# Patient Record
Sex: FEMALE | Race: WHITE | NOT HISPANIC OR LATINO | URBAN - METROPOLITAN AREA
[De-identification: names, ages, dates, MRNs, and addresses within clinical notes are randomized per-mention and may not be internally consistent; named-entity substitution may affect disease eponyms.]

---

## 2021-01-14 ENCOUNTER — OUTPATIENT (OUTPATIENT)
Dept: OUTPATIENT SERVICES | Facility: HOSPITAL | Age: 36
LOS: 1 days | End: 2021-01-14
Payer: COMMERCIAL

## 2021-01-14 VITALS — HEART RATE: 82 BPM | OXYGEN SATURATION: 97 %

## 2021-01-14 VITALS
DIASTOLIC BLOOD PRESSURE: 50 MMHG | RESPIRATION RATE: 16 BRPM | OXYGEN SATURATION: 97 % | SYSTOLIC BLOOD PRESSURE: 91 MMHG | HEART RATE: 79 BPM | TEMPERATURE: 98 F

## 2021-01-14 DIAGNOSIS — O26.899 OTHER SPECIFIED PREGNANCY RELATED CONDITIONS, UNSPECIFIED TRIMESTER: ICD-10-CM

## 2021-01-14 DIAGNOSIS — Z3A.00 WEEKS OF GESTATION OF PREGNANCY NOT SPECIFIED: ICD-10-CM

## 2021-01-14 PROCEDURE — G0463: CPT

## 2021-01-14 PROCEDURE — 59025 FETAL NON-STRESS TEST: CPT

## 2021-01-14 NOTE — OB PROVIDER TRIAGE NOTE - HISTORY OF PRESENT ILLNESS
Pt is a 35y GP @ wks presenting for . PNC Uncomplicated. Endorses good FM. Denies LOF/VB/frequent Ctx. GBS Neg. EFW      EFW  GBS  PNC    OBHx:   GYNHx:   PMH:   PSH:  Meds:  All:  Soc: No EtOH, tobacco, illicit drug use in pregnancy  Psych:   FHx: No bleeding/clotting disorders in pregnancy    T(C): 36.9 (01-14-21 @ 12:42), Max: 36.9 (01-14-21 @ 11:41)  HR: 82 (01-14-21 @ 12:51) (76 - 87)  BP: 100/59 (01-14-21 @ 12:42) (91/50 - 100/59)  RR: 16 (01-14-21 @ 11:53) (16 - 16)  SpO2: 97% (01-14-21 @ 12:51) (96% - 98%)  Sono: Vtx  VE:  EFM: FHR  [place]: Ctx Qmin      A/P: Pt is a 35y GP @ wks presenting for.  -Admit to L&D, Routine labs, IVF  -IOL With:   -EFM + [place]    D/w Dr. Hannah Ryan PGY- Pt is a 35y  @31w3d GA by [facility] 3/15/21, presents c/o decreased FM. PNC Uncomplicated. Denies LOF/VB/Ctx.    EFW unknown  GBS unknown    OBH: 3/2019 FT pC/S 2/2 macrosomia  GYNH: denies fibroids/cysts/abnormal paps/STIs   PM/SH: denies   Meds: PNV  All: NKDA  Soc: Denies alcohol, tobacco, illicit drug use in pregnancy

## 2021-01-14 NOTE — OB PROVIDER TRIAGE NOTE - NSHPPHYSICALEXAM_GEN_ALL_CORE
T(C): 36.9 (01-14-21 @ 12:42), Max: 36.9 (01-14-21 @ 11:41)  HR: 82 (01-14-21 @ 12:51) (76 - 87)  BP: 100/59 (01-14-21 @ 12:42) (91/50 - 100/59)  RR: 16 (01-14-21 @ 11:53) (16 - 16)  SpO2: 97% (01-14-21 @ 12:51) (96% - 98%)    Gen: NAD  Abd: Soft, NT, gravid  EFM:  135/mod/+accels/-deccels  [place]: no ctx  BSS: breech, posterior placenta, +FHR, SANTIAGO 14.2, BPP 8/8

## 2021-01-14 NOTE — OB PROVIDER TRIAGE NOTE - NSOBPROVIDERNOTE_OBGYN_ALL_OB_FT
A/P: 35y  @31w3d GA presents c/o decreased FM. BPP /8, NST reactive.     -Patient cleared for discharge  - precautions given     D/w Dr. Beni Ryan PGY-3

## 2021-02-26 ENCOUNTER — OUTPATIENT (OUTPATIENT)
Dept: OUTPATIENT SERVICES | Facility: HOSPITAL | Age: 36
LOS: 1 days | End: 2021-02-26
Payer: COMMERCIAL

## 2021-02-26 VITALS
HEIGHT: 61 IN | SYSTOLIC BLOOD PRESSURE: 101 MMHG | RESPIRATION RATE: 16 BRPM | TEMPERATURE: 99 F | WEIGHT: 158.07 LBS | HEART RATE: 86 BPM | OXYGEN SATURATION: 98 % | DIASTOLIC BLOOD PRESSURE: 62 MMHG

## 2021-02-26 DIAGNOSIS — O34.211 MATERNAL CARE FOR LOW TRANSVERSE SCAR FROM PREVIOUS CESAREAN DELIVERY: ICD-10-CM

## 2021-02-26 DIAGNOSIS — Z29.9 ENCOUNTER FOR PROPHYLACTIC MEASURES, UNSPECIFIED: ICD-10-CM

## 2021-02-26 DIAGNOSIS — Z01.818 ENCOUNTER FOR OTHER PREPROCEDURAL EXAMINATION: ICD-10-CM

## 2021-02-26 PROBLEM — Z78.9 OTHER SPECIFIED HEALTH STATUS: Chronic | Status: ACTIVE | Noted: 2021-01-14

## 2021-02-26 LAB
BLD GP AB SCN SERPL QL: NEGATIVE — SIGNIFICANT CHANGE UP
HCT VFR BLD CALC: 31.3 % — LOW (ref 34.5–45)
HGB BLD-MCNC: 10 G/DL — LOW (ref 11.5–15.5)
MCHC RBC-ENTMCNC: 27.2 PG — SIGNIFICANT CHANGE UP (ref 27–34)
MCHC RBC-ENTMCNC: 31.9 GM/DL — LOW (ref 32–36)
MCV RBC AUTO: 85.1 FL — SIGNIFICANT CHANGE UP (ref 80–100)
NRBC # BLD: 0 /100 WBCS — SIGNIFICANT CHANGE UP (ref 0–0)
PLATELET # BLD AUTO: 228 K/UL — SIGNIFICANT CHANGE UP (ref 150–400)
RBC # BLD: 3.68 M/UL — LOW (ref 3.8–5.2)
RBC # FLD: 13.4 % — SIGNIFICANT CHANGE UP (ref 10.3–14.5)
RH IG SCN BLD-IMP: POSITIVE — SIGNIFICANT CHANGE UP
WBC # BLD: 9.99 K/UL — SIGNIFICANT CHANGE UP (ref 3.8–10.5)
WBC # FLD AUTO: 9.99 K/UL — SIGNIFICANT CHANGE UP (ref 3.8–10.5)

## 2021-02-26 PROCEDURE — 85027 COMPLETE CBC AUTOMATED: CPT

## 2021-02-26 PROCEDURE — 86900 BLOOD TYPING SEROLOGIC ABO: CPT

## 2021-02-26 PROCEDURE — 86901 BLOOD TYPING SEROLOGIC RH(D): CPT

## 2021-02-26 PROCEDURE — G0463: CPT

## 2021-02-26 PROCEDURE — 86850 RBC ANTIBODY SCREEN: CPT

## 2021-02-26 RX ORDER — OXYTOCIN 10 UNIT/ML
333.33 VIAL (ML) INJECTION
Qty: 20 | Refills: 0 | Status: DISCONTINUED | OUTPATIENT
Start: 2021-03-12 | End: 2021-03-14

## 2021-02-26 NOTE — OB PST NOTE - PROBLEM SELECTOR PLAN 1
Repeat C section on 3/12/21  Pre- Op Instructions discussed   Labs sent  Covid scheduled 3 days prior to surgery

## 2021-02-26 NOTE — OB PST NOTE - ASSESSMENT
CAPRINI SCORE [CLOT updated 18]    AGE RELATED RISK FACTORS                                                       MOBILITY RELATED FACTORS  [ ] Age 41-60 years                                            (1 Point)                    [ ] Bed rest                                                        (1 Point)  [ ] Age: 61-74 years                                           (2 Points)                  [ ] Plaster cast                                                   (2 Points)  [ ] Age= 75 years                                              (3 Points)                    [ ] Bed bound for more than 72 hours                 (2 Points)    DISEASE RELATED RISK FACTORS                                               GENDER SPECIFIC FACTORS  [ ] Edema in the lower extremities                       (1 Point)              [ x] Pregnancy                                                     (1 Point)  [ ] Varicose veins                                               (1 Point)                     [ ] Post-partum < 6 weeks                                   (1 Point)             [x ] BMI > 25 Kg/m2                                            (1 Point)                     [ ] Hormonal therapy  or oral contraception          (1 Point)                 [ ] Sepsis (in the previous month)                        (1 Point)               [ ] History of pregnancy complications                 (1 point)  [ ] Pneumonia or serious lung disease                                               [ ] Unexplained or recurrent                     (1 Point)           (in the previous month)                               (1 Point)  [ ] Abnormal pulmonary function test                     (1 Point)                 SURGERY RELATED RISK FACTORS  [ ] Acute myocardial infarction                              (1 Point)               [ ]  Section                                             (1 Point)  [ ] Congestive heart failure (in the previous month)  (1 Point)      [ ] Minor surgery                                                  (1 Point)   [ ] Inflammatory bowel disease                             (1 Point)               [ ] Arthroscopic surgery                                        (2 Points)  [ ] Central venous access                                      (2 Points)                [x ] General surgery lasting more than 45 minutes (2 points)  [ ] Present or previous malignancy                     (2 Points)                [ ] Elective arthroplasty                                         (5 points)    [ ] Stroke (in the previous month)                          (5 Points)                                                                                                                                                           HEMATOLOGY RELATED FACTORS                                                 TRAUMA RELATED RISK FACTORS  [ ] Prior episodes of VTE                                     (3 Points)                [ ] Fracture of the hip, pelvis, or leg                       (5 Points)  [ ] Positive family history for VTE                         (3 Points)             [ ] Acute spinal cord injury (in the previous month)  (5 Points)  [ ] Prothrombin 53878 A                                     (3 Points)               [ ] Paralysis  (less than 1 month)                             (5 Points)  [ ] Factor V Leiden                                             (3 Points)                  [ ] Multiple Trauma within 1 month                        (5 Points)  [ ] Lupus anticoagulants                                     (3 Points)                                                           [ ] Anticardiolipin antibodies                               (3 Points)                                                       [ ] High homocysteine in the blood                      (3 Points)                                             [ ] Other congenital or acquired thrombophilia      (3 Points)                                                [ ] Heparin induced thrombocytopenia                  (3 Points)                                     Total Score [      4    ]

## 2021-02-26 NOTE — OB PST NOTE - NSHPPHYSICALEXAM_GEN_ALL_CORE
PHYSICAL EXAM:      Constitutional: NAD  HEENT: NCAT, PERRLA, EOMI,  Neck: No LAD, No JVD  Respiratory: CTA b/l ,  No Wheezing /rhonchi  Cardiovascular: S1 and S2 +  Gastrointestinal:  Soft, NT, ND, BS +  Extremities: No peripheral edema  Neurological: AAO x 3,  Gross motor and sensational intact. no focal deficits

## 2021-02-26 NOTE — OB PST NOTE - HISTORY OF PRESENT ILLNESS
36 y/o female , 37 weeks/4 days gestation with h/o c section planned for repeat C section on 3/15/21. Pt denies any fever, chills cold symptoms , covid exposure or recent travel.     Covid test scheduled 3 days prior to c section

## 2021-02-26 NOTE — OB PST NOTE - NSANTHOSAYNRD_GEN_A_CORE
No. AIDEN screening performed.  STOP BANG Legend: 0-2 = LOW Risk; 3-4 = INTERMEDIATE Risk; 5-8 = HIGH Risk

## 2021-03-10 ENCOUNTER — OUTPATIENT (OUTPATIENT)
Dept: OUTPATIENT SERVICES | Facility: HOSPITAL | Age: 36
LOS: 1 days | End: 2021-03-10
Payer: COMMERCIAL

## 2021-03-10 DIAGNOSIS — Z11.52 ENCOUNTER FOR SCREENING FOR COVID-19: ICD-10-CM

## 2021-03-10 LAB — SARS-COV-2 RNA SPEC QL NAA+PROBE: SIGNIFICANT CHANGE UP

## 2021-03-10 PROCEDURE — C9803: CPT

## 2021-03-10 PROCEDURE — U0005: CPT

## 2021-03-10 PROCEDURE — U0003: CPT

## 2021-03-12 ENCOUNTER — INPATIENT (INPATIENT)
Facility: HOSPITAL | Age: 36
LOS: 1 days | Discharge: HOME | End: 2021-03-14
Attending: OBSTETRICS & GYNECOLOGY | Admitting: OBSTETRICS & GYNECOLOGY
Payer: COMMERCIAL

## 2021-03-12 VITALS
RESPIRATION RATE: 18 BRPM | SYSTOLIC BLOOD PRESSURE: 100 MMHG | HEIGHT: 61 IN | TEMPERATURE: 97 F | HEART RATE: 60 BPM | WEIGHT: 156.09 LBS | DIASTOLIC BLOOD PRESSURE: 58 MMHG

## 2021-03-12 DIAGNOSIS — Z01.818 ENCOUNTER FOR OTHER PREPROCEDURAL EXAMINATION: ICD-10-CM

## 2021-03-12 DIAGNOSIS — O34.211 MATERNAL CARE FOR LOW TRANSVERSE SCAR FROM PREVIOUS CESAREAN DELIVERY: ICD-10-CM

## 2021-03-12 LAB
BLD GP AB SCN SERPL QL: NEGATIVE — SIGNIFICANT CHANGE UP
RH IG SCN BLD-IMP: POSITIVE — SIGNIFICANT CHANGE UP
SARS-COV-2 IGG SERPL QL IA: POSITIVE
SARS-COV-2 IGM SERPL IA-ACNC: 2.53 INDEX — HIGH
T PALLIDUM AB TITR SER: NEGATIVE — SIGNIFICANT CHANGE UP

## 2021-03-12 RX ORDER — NALOXONE HYDROCHLORIDE 4 MG/.1ML
0.1 SPRAY NASAL
Refills: 0 | Status: DISCONTINUED | OUTPATIENT
Start: 2021-03-12 | End: 2021-03-14

## 2021-03-12 RX ORDER — MAGNESIUM HYDROXIDE 400 MG/1
30 TABLET, CHEWABLE ORAL
Refills: 0 | Status: DISCONTINUED | OUTPATIENT
Start: 2021-03-12 | End: 2021-03-14

## 2021-03-12 RX ORDER — LANOLIN
1 OINTMENT (GRAM) TOPICAL EVERY 6 HOURS
Refills: 0 | Status: DISCONTINUED | OUTPATIENT
Start: 2021-03-12 | End: 2021-03-14

## 2021-03-12 RX ORDER — IBUPROFEN 200 MG
600 TABLET ORAL EVERY 6 HOURS
Refills: 0 | Status: COMPLETED | OUTPATIENT
Start: 2021-03-12 | End: 2022-02-08

## 2021-03-12 RX ORDER — OXYCODONE HYDROCHLORIDE 5 MG/1
5 TABLET ORAL
Refills: 0 | Status: DISCONTINUED | OUTPATIENT
Start: 2021-03-12 | End: 2021-03-12

## 2021-03-12 RX ORDER — DIPHENHYDRAMINE HCL 50 MG
25 CAPSULE ORAL EVERY 6 HOURS
Refills: 0 | Status: COMPLETED | OUTPATIENT
Start: 2021-03-12 | End: 2022-02-08

## 2021-03-12 RX ORDER — DIPHENHYDRAMINE HCL 50 MG
25 CAPSULE ORAL EVERY 6 HOURS
Refills: 0 | Status: DISCONTINUED | OUTPATIENT
Start: 2021-03-12 | End: 2021-03-14

## 2021-03-12 RX ORDER — OXYCODONE HYDROCHLORIDE 5 MG/1
5 TABLET ORAL
Refills: 0 | Status: COMPLETED | OUTPATIENT
Start: 2021-03-12 | End: 2021-03-19

## 2021-03-12 RX ORDER — MORPHINE SULFATE 50 MG/1
0.1 CAPSULE, EXTENDED RELEASE ORAL ONCE
Refills: 0 | Status: DISCONTINUED | OUTPATIENT
Start: 2021-03-12 | End: 2021-03-13

## 2021-03-12 RX ORDER — KETOROLAC TROMETHAMINE 30 MG/ML
30 SYRINGE (ML) INJECTION EVERY 6 HOURS
Refills: 0 | Status: DISCONTINUED | OUTPATIENT
Start: 2021-03-12 | End: 2021-03-13

## 2021-03-12 RX ORDER — FAMOTIDINE 10 MG/ML
20 INJECTION INTRAVENOUS ONCE
Refills: 0 | Status: COMPLETED | OUTPATIENT
Start: 2021-03-12 | End: 2021-03-12

## 2021-03-12 RX ORDER — CITRIC ACID/SODIUM CITRATE 300-500 MG
15 SOLUTION, ORAL ORAL ONCE
Refills: 0 | Status: COMPLETED | OUTPATIENT
Start: 2021-03-12 | End: 2021-03-12

## 2021-03-12 RX ORDER — CHLORHEXIDINE GLUCONATE 213 G/1000ML
1 SOLUTION TOPICAL ONCE
Refills: 0 | Status: COMPLETED | OUTPATIENT
Start: 2021-03-12 | End: 2021-03-12

## 2021-03-12 RX ORDER — DIPHENOXYLATE HCL/ATROPINE 2.5-.025MG
2 TABLET ORAL ONCE
Refills: 0 | Status: DISCONTINUED | OUTPATIENT
Start: 2021-03-12 | End: 2021-03-12

## 2021-03-12 RX ORDER — SODIUM CHLORIDE 9 MG/ML
1000 INJECTION, SOLUTION INTRAVENOUS ONCE
Refills: 0 | Status: COMPLETED | OUTPATIENT
Start: 2021-03-12 | End: 2021-03-12

## 2021-03-12 RX ORDER — ACETAMINOPHEN 500 MG
975 TABLET ORAL
Refills: 0 | Status: DISCONTINUED | OUTPATIENT
Start: 2021-03-12 | End: 2021-03-14

## 2021-03-12 RX ORDER — TETANUS TOXOID, REDUCED DIPHTHERIA TOXOID AND ACELLULAR PERTUSSIS VACCINE, ADSORBED 5; 2.5; 8; 8; 2.5 [IU]/.5ML; [IU]/.5ML; UG/.5ML; UG/.5ML; UG/.5ML
0.5 SUSPENSION INTRAMUSCULAR ONCE
Refills: 0 | Status: DISCONTINUED | OUTPATIENT
Start: 2021-03-12 | End: 2021-03-14

## 2021-03-12 RX ORDER — OXYTOCIN 10 UNIT/ML
333.33 VIAL (ML) INJECTION
Qty: 20 | Refills: 0 | Status: DISCONTINUED | OUTPATIENT
Start: 2021-03-12 | End: 2021-03-14

## 2021-03-12 RX ORDER — HEPARIN SODIUM 5000 [USP'U]/ML
5000 INJECTION INTRAVENOUS; SUBCUTANEOUS EVERY 12 HOURS
Refills: 0 | Status: DISCONTINUED | OUTPATIENT
Start: 2021-03-12 | End: 2021-03-14

## 2021-03-12 RX ORDER — ONDANSETRON 8 MG/1
4 TABLET, FILM COATED ORAL EVERY 6 HOURS
Refills: 0 | Status: DISCONTINUED | OUTPATIENT
Start: 2021-03-12 | End: 2021-03-14

## 2021-03-12 RX ORDER — NALBUPHINE HYDROCHLORIDE 10 MG/ML
2.5 INJECTION, SOLUTION INTRAMUSCULAR; INTRAVENOUS; SUBCUTANEOUS EVERY 6 HOURS
Refills: 0 | Status: DISCONTINUED | OUTPATIENT
Start: 2021-03-12 | End: 2021-03-14

## 2021-03-12 RX ORDER — CEFAZOLIN SODIUM 1 G
2000 VIAL (EA) INJECTION ONCE
Refills: 0 | Status: COMPLETED | OUTPATIENT
Start: 2021-03-12 | End: 2021-03-12

## 2021-03-12 RX ORDER — SIMETHICONE 80 MG/1
80 TABLET, CHEWABLE ORAL EVERY 4 HOURS
Refills: 0 | Status: DISCONTINUED | OUTPATIENT
Start: 2021-03-12 | End: 2021-03-14

## 2021-03-12 RX ORDER — SODIUM CHLORIDE 9 MG/ML
1000 INJECTION, SOLUTION INTRAVENOUS
Refills: 0 | Status: DISCONTINUED | OUTPATIENT
Start: 2021-03-12 | End: 2021-03-12

## 2021-03-12 RX ORDER — METOCLOPRAMIDE HCL 10 MG
10 TABLET ORAL ONCE
Refills: 0 | Status: COMPLETED | OUTPATIENT
Start: 2021-03-12 | End: 2021-03-12

## 2021-03-12 RX ORDER — DEXAMETHASONE 0.5 MG/5ML
4 ELIXIR ORAL EVERY 6 HOURS
Refills: 0 | Status: DISCONTINUED | OUTPATIENT
Start: 2021-03-12 | End: 2021-03-14

## 2021-03-12 RX ORDER — OXYCODONE HYDROCHLORIDE 5 MG/1
10 TABLET ORAL
Refills: 0 | Status: DISCONTINUED | OUTPATIENT
Start: 2021-03-12 | End: 2021-03-12

## 2021-03-12 RX ADMIN — SODIUM CHLORIDE 125 MILLILITER(S): 9 INJECTION, SOLUTION INTRAVENOUS at 09:53

## 2021-03-12 RX ADMIN — HEPARIN SODIUM 5000 UNIT(S): 5000 INJECTION INTRAVENOUS; SUBCUTANEOUS at 20:32

## 2021-03-12 RX ADMIN — MAGNESIUM HYDROXIDE 30 MILLILITER(S): 400 TABLET, CHEWABLE ORAL at 20:31

## 2021-03-12 RX ADMIN — Medication 30 MILLIGRAM(S): at 23:25

## 2021-03-12 RX ADMIN — Medication 30 MILLIGRAM(S): at 16:15

## 2021-03-12 RX ADMIN — ONDANSETRON 4 MILLIGRAM(S): 8 TABLET, FILM COATED ORAL at 07:34

## 2021-03-12 RX ADMIN — Medication 4 MILLIGRAM(S): at 07:58

## 2021-03-12 RX ADMIN — Medication 2 TABLET(S): at 09:15

## 2021-03-12 RX ADMIN — Medication 975 MILLIGRAM(S): at 20:32

## 2021-03-12 RX ADMIN — FAMOTIDINE 20 MILLIGRAM(S): 10 INJECTION INTRAVENOUS at 06:57

## 2021-03-12 RX ADMIN — Medication 1000 MILLIUNIT(S)/MIN: at 09:52

## 2021-03-12 RX ADMIN — Medication 30 MILLIGRAM(S): at 15:35

## 2021-03-12 RX ADMIN — CHLORHEXIDINE GLUCONATE 1 APPLICATION(S): 213 SOLUTION TOPICAL at 06:58

## 2021-03-12 RX ADMIN — Medication 15 MILLILITER(S): at 06:57

## 2021-03-12 RX ADMIN — Medication 10 MILLIGRAM(S): at 07:34

## 2021-03-12 RX ADMIN — Medication 975 MILLIGRAM(S): at 21:30

## 2021-03-12 RX ADMIN — Medication 975 MILLIGRAM(S): at 13:02

## 2021-03-12 RX ADMIN — Medication 100 MILLIGRAM(S): at 07:43

## 2021-03-12 RX ADMIN — Medication 25 MILLIGRAM(S): at 23:42

## 2021-03-12 RX ADMIN — SODIUM CHLORIDE 2000 MILLILITER(S): 9 INJECTION, SOLUTION INTRAVENOUS at 06:58

## 2021-03-12 NOTE — OB PROVIDER DELIVERY SUMMARY - NSPROVIDERDELIVERYNOTE_OBGYN_ALL_OB_FT
scheduled rLTCS, uncomplicated  viable female infant, vertex presentation, Apgars 8/9, 8lb cord gasses sent  Grossly normal fallopian tubes, uterus, and ovaries  Hysterotomy closed in single layer with caprosyn  Methergine, Hemabate and Cytotec given intraoperatively due to atony, which resolved     QBL: 604  EBL: 800  IVF: 1900  UOP: 325    Acnef 2g     Juan PGY2  w/ Dr. Kang

## 2021-03-12 NOTE — OB PROVIDER H&P - ASSESSMENT
36yo  at 39+4 for scheduled rLTCS  - Per OR schedule  - Anesthesia consult  - Consents to be signed with Dr. Jorge Luis Martinez PGY2

## 2021-03-12 NOTE — PRE-ANESTHESIA EVALUATION ADULT - NSANTHPMHFT_GEN_ALL_CORE
No cardiac or pulmonary disease  No bleeding or clotting disorders  No issues with current pregnancy

## 2021-03-12 NOTE — OB PROVIDER H&P - NSHPPHYSICALEXAM_GEN_ALL_CORE
Vital Signs Last 24 Hrs  T(C): --  T(F): --  HR: 81 (12 Mar 2021 06:07) (81 - 81)  BP: 100/58 (12 Mar 2021 06:07) (100/58 - 100/58)  BP(mean): --  RR: --  SpO2: --    Gen NAD AOX3  Abd soft, nontender, gravid  Ext nontender b/l    BSS vertex, anterior placenta   mod suzie +accel -decel  Ctx irritability on toco

## 2021-03-12 NOTE — OB NEONATOLOGY/PEDIATRICIAN DELIVERY SUMMARY - NSPEDSNEONOTESA_OBGYN_ALL_OB_FT
39.4wk female born via rpt C/S to a 36y/o  mother. Mother with blood type B+. GBS negative on , no ROM or labor. PNL neg/NR/I. ROM at delivery, clear. Mother has no PMH. No pregnancy complications. Baby warmed/dried/stimulated and started to cry vigorously. Apgars 8/9. EOS N/A given no ROM or labor. No maternal fever. Mother wants to breastfeed. Consents to Hep B.

## 2021-03-12 NOTE — OB RN DELIVERY SUMMARY - NS_SEPSISRSKCALC_OBGYN_ALL_OB_FT
EOS calculated successfully. EOS Risk Factor: 0.5/1000 live births ([facility] national incidence); GA=39w4d; Temp=97.2; ROM=0.017; GBS='Unknown'; Antibiotics='No antibiotics or any antibiotics < 2 hrs prior to birth'

## 2021-03-12 NOTE — OB PROVIDER H&P - HISTORY OF PRESENT ILLNESS
36yo  at 39+4 for scheduled rLTCS. No ctx, lof, vb. +FM.     PNC uncomplicated. Denies GDM. Denies HTN.   ObHx pLTCS 2019 for arrest of dilation 8#6 ([facility])  GynHx denies  PMHx denies, denies asthma  PSurgHx C/S  Meds PNV  All NKDA

## 2021-03-12 NOTE — CHART NOTE - NSCHARTNOTEFT_GEN_A_CORE
NP Chart Note:    at the bedside for patient evaluation after two uterotonic medications administered during the surgery.   The patient denies SOB, chest pain, dizziness. The patient additionally denies nausea/vomiting    ICU Vital Signs Last 24 Hrs  T(C): 36.9 (12 Mar 2021 11:25), Max: 36.9 (12 Mar 2021 11:25)  T(F): 98.4 (12 Mar 2021 11:25), Max: 98.4 (12 Mar 2021 11:25)  HR: 72 (12 Mar 2021 11:25) (56 - 84)  BP: 119/74 (12 Mar 2021 11:25) (100/58 - 126/61)  BP(mean): 86 (12 Mar 2021 11:25) (78 - 88)  ABP: --  ABP(mean): --  RR: 20 (12 Mar 2021 11:25) (13 - 33)  SpO2: 95% (12 Mar 2021 11:25) (95% - 99%)    gen: A&Ox3  CV: rrr s1s2  abd: soft, non-distended, fundus firm  Ext: bilateral pedal pulses    34 y/o rltcs, EBL: 800, methergine, cytotec, and hemabate administered during surgery with good resulting hemostasis  -patient cleared for discharge from PACU to a post partum floor    Sarita Mae NP

## 2021-03-13 LAB
HCT VFR BLD CALC: 26.5 % — LOW (ref 34.5–45)
HGB BLD-MCNC: 8.2 G/DL — LOW (ref 11.5–15.5)
MCHC RBC-ENTMCNC: 25.9 PG — LOW (ref 27–34)
MCHC RBC-ENTMCNC: 30.9 GM/DL — LOW (ref 32–36)
MCV RBC AUTO: 83.9 FL — SIGNIFICANT CHANGE UP (ref 80–100)
NRBC # BLD: 0 /100 WBCS — SIGNIFICANT CHANGE UP (ref 0–0)
PLATELET # BLD AUTO: 192 K/UL — SIGNIFICANT CHANGE UP (ref 150–400)
RBC # BLD: 3.16 M/UL — LOW (ref 3.8–5.2)
RBC # FLD: 13.8 % — SIGNIFICANT CHANGE UP (ref 10.3–14.5)
WBC # BLD: 10.39 K/UL — SIGNIFICANT CHANGE UP (ref 3.8–10.5)
WBC # FLD AUTO: 10.39 K/UL — SIGNIFICANT CHANGE UP (ref 3.8–10.5)

## 2021-03-13 RX ORDER — OXYCODONE HYDROCHLORIDE 5 MG/1
5 TABLET ORAL
Refills: 0 | Status: DISCONTINUED | OUTPATIENT
Start: 2021-03-13 | End: 2021-03-14

## 2021-03-13 RX ORDER — FERROUS SULFATE 325(65) MG
325 TABLET ORAL THREE TIMES A DAY
Refills: 0 | Status: DISCONTINUED | OUTPATIENT
Start: 2021-03-13 | End: 2021-03-14

## 2021-03-13 RX ORDER — ASCORBIC ACID 60 MG
500 TABLET,CHEWABLE ORAL THREE TIMES A DAY
Refills: 0 | Status: DISCONTINUED | OUTPATIENT
Start: 2021-03-13 | End: 2021-03-14

## 2021-03-13 RX ORDER — IBUPROFEN 200 MG
600 TABLET ORAL EVERY 6 HOURS
Refills: 0 | Status: DISCONTINUED | OUTPATIENT
Start: 2021-03-13 | End: 2021-03-14

## 2021-03-13 RX ORDER — ONDANSETRON 8 MG/1
4 TABLET, FILM COATED ORAL ONCE
Refills: 0 | Status: COMPLETED | OUTPATIENT
Start: 2021-03-13 | End: 2021-03-13

## 2021-03-13 RX ADMIN — Medication 600 MILLIGRAM(S): at 18:53

## 2021-03-13 RX ADMIN — Medication 30 MILLIGRAM(S): at 12:21

## 2021-03-13 RX ADMIN — Medication 30 MILLIGRAM(S): at 13:01

## 2021-03-13 RX ADMIN — Medication 500 MILLIGRAM(S): at 18:53

## 2021-03-13 RX ADMIN — ONDANSETRON 4 MILLIGRAM(S): 8 TABLET, FILM COATED ORAL at 21:30

## 2021-03-13 RX ADMIN — Medication 975 MILLIGRAM(S): at 10:00

## 2021-03-13 RX ADMIN — HEPARIN SODIUM 5000 UNIT(S): 5000 INJECTION INTRAVENOUS; SUBCUTANEOUS at 21:02

## 2021-03-13 RX ADMIN — Medication 975 MILLIGRAM(S): at 17:15

## 2021-03-13 RX ADMIN — Medication 975 MILLIGRAM(S): at 02:31

## 2021-03-13 RX ADMIN — SIMETHICONE 80 MILLIGRAM(S): 80 TABLET, CHEWABLE ORAL at 19:56

## 2021-03-13 RX ADMIN — Medication 325 MILLIGRAM(S): at 18:53

## 2021-03-13 RX ADMIN — Medication 975 MILLIGRAM(S): at 22:40

## 2021-03-13 RX ADMIN — Medication 30 MILLIGRAM(S): at 06:01

## 2021-03-13 RX ADMIN — Medication 975 MILLIGRAM(S): at 16:38

## 2021-03-13 RX ADMIN — Medication 30 MILLIGRAM(S): at 00:25

## 2021-03-13 RX ADMIN — HEPARIN SODIUM 5000 UNIT(S): 5000 INJECTION INTRAVENOUS; SUBCUTANEOUS at 09:15

## 2021-03-13 RX ADMIN — Medication 975 MILLIGRAM(S): at 09:16

## 2021-03-13 RX ADMIN — Medication 975 MILLIGRAM(S): at 03:30

## 2021-03-13 RX ADMIN — Medication 30 MILLIGRAM(S): at 05:04

## 2021-03-13 RX ADMIN — Medication 975 MILLIGRAM(S): at 23:30

## 2021-03-13 NOTE — CHART NOTE - NSCHARTNOTEFT_GEN_A_CORE
PA NOTE            POD#1         Vital Signs Last 24 Hrs  T(C): 36.7 (13 Mar 2021 05:57), Max: 37.2 (12 Mar 2021 15:00)  T(F): 98 (13 Mar 2021 05:57), Max: 98.9 (12 Mar 2021 15:00)  HR: 73 (13 Mar 2021 05:57) (56 - 84)  BP: 92/54 (13 Mar 2021 05:57) (92/54 - 125/65)  BP(mean): 86 (12 Mar 2021 11:25) (78 - 88)  RR: 18 (13 Mar 2021 05:57) (18 - 33)  SpO2: 97% (13 Mar 2021 05:57) (95% - 99%)               8.2    10.39 )-----------( 192      ( 03-13 @ 07:48 )             26.5           Assessment: Anemia secondary to acute blood loss due to delivery    Plan:  - Ferrous Sulfate, Vitamin C supplementation.  - Monitor for signs/symptoms of anemia.   - Does not require transfusion at this time

## 2021-03-14 VITALS
HEART RATE: 71 BPM | RESPIRATION RATE: 18 BRPM | OXYGEN SATURATION: 96 % | TEMPERATURE: 98 F | DIASTOLIC BLOOD PRESSURE: 61 MMHG | SYSTOLIC BLOOD PRESSURE: 108 MMHG

## 2021-03-14 PROCEDURE — 86901 BLOOD TYPING SEROLOGIC RH(D): CPT

## 2021-03-14 PROCEDURE — 86769 SARS-COV-2 COVID-19 ANTIBODY: CPT

## 2021-03-14 PROCEDURE — 59025 FETAL NON-STRESS TEST: CPT

## 2021-03-14 PROCEDURE — 85025 COMPLETE CBC W/AUTO DIFF WBC: CPT

## 2021-03-14 PROCEDURE — 86900 BLOOD TYPING SEROLOGIC ABO: CPT

## 2021-03-14 PROCEDURE — 86850 RBC ANTIBODY SCREEN: CPT

## 2021-03-14 PROCEDURE — 59050 FETAL MONITOR W/REPORT: CPT

## 2021-03-14 PROCEDURE — 86780 TREPONEMA PALLIDUM: CPT

## 2021-03-14 RX ORDER — ACETAMINOPHEN 500 MG
3 TABLET ORAL
Qty: 0 | Refills: 0 | DISCHARGE
Start: 2021-03-14

## 2021-03-14 RX ORDER — IBUPROFEN 200 MG
1 TABLET ORAL
Qty: 0 | Refills: 0 | DISCHARGE
Start: 2021-03-14

## 2021-03-14 RX ORDER — OXYCODONE HYDROCHLORIDE 5 MG/1
1 TABLET ORAL
Qty: 10 | Refills: 0
Start: 2021-03-14

## 2021-03-14 RX ADMIN — Medication 600 MILLIGRAM(S): at 01:20

## 2021-03-14 RX ADMIN — Medication 600 MILLIGRAM(S): at 01:59

## 2021-03-14 RX ADMIN — Medication 600 MILLIGRAM(S): at 09:28

## 2021-03-14 RX ADMIN — Medication 975 MILLIGRAM(S): at 12:32

## 2021-03-14 RX ADMIN — Medication 600 MILLIGRAM(S): at 09:58

## 2021-03-14 RX ADMIN — Medication 325 MILLIGRAM(S): at 05:58

## 2021-03-14 RX ADMIN — OXYCODONE HYDROCHLORIDE 5 MILLIGRAM(S): 5 TABLET ORAL at 05:57

## 2021-03-14 RX ADMIN — Medication 975 MILLIGRAM(S): at 13:02

## 2021-03-14 RX ADMIN — Medication 975 MILLIGRAM(S): at 05:57

## 2021-03-14 RX ADMIN — HEPARIN SODIUM 5000 UNIT(S): 5000 INJECTION INTRAVENOUS; SUBCUTANEOUS at 09:31

## 2021-03-14 RX ADMIN — Medication 500 MILLIGRAM(S): at 05:58

## 2021-03-14 NOTE — DISCHARGE NOTE OB - CARE PROVIDER_API CALL
Ulysses Bazan J  OBSTETRICS AND GYNECOLOGY  [address] [place] [address]  Phone: [phone]  Fax: [phone]  Follow Up Time:

## 2021-03-14 NOTE — PROGRESS NOTE ADULT - ASSESSMENT
A/P: 34yo POD#2 s/p LTCS.  Patient is stable and doing well post-operatively.  
Stable post op
    A/P:  35y  POD # 1 S/P  repeat    section, sp methergine,hemabate,cytotec for atony, ebl 800  Doing well    PMHx:PAST MEDICAL & SURGICAL HISTORY:  No pertinent past medical history     delivery delivered  2019      Current Issues:

## 2021-03-14 NOTE — PROGRESS NOTE ADULT - SUBJECTIVE AND OBJECTIVE BOX
Pt seen and examined at bedside. Pt states mild abdominal pain. Pt [x ] ambulating, tolerating _reg__ diet, [ ] flatus, [ ]BM, [x ] urinating adequately.   Pt denies fever, chills, chest pain, SOB, nausea, vomiting, lightheadedness, dizziness.      T(F): 98 (03-13-21 @ 05:57), Max: 98.9 (03-12-21 @ 15:00)  HR: 73 (03-13-21 @ 05:57) (56 - 84)  BP: 92/54 (03-13-21 @ 05:57) (92/54 - 126/61)  RR: 18 (03-13-21 @ 05:57) (13 - 33)  SpO2: 97% (03-13-21 @ 05:57) (95% - 99%)  Wt(kg): --  I&O's Summary    12 Mar 2021 07:01  -  13 Mar 2021 07:00  --------------------------------------------------------  IN: 2400 mL / OUT: 2429 mL / NET: -29 mL        MEDICATIONS  (STANDING):  acetaminophen   Tablet .. 975 milliGRAM(s) Oral <User Schedule>  diphtheria/tetanus/pertussis (acellular) Vaccine (ADAcel) 0.5 milliLiter(s) IntraMuscular once  heparin   Injectable 5000 Unit(s) SubCutaneous every 12 hours  ibuprofen  Tablet. 600 milliGRAM(s) Oral every 6 hours  ketorolac   Injectable 30 milliGRAM(s) IV Push every 6 hours  oxytocin Infusion 333.333 milliUNIT(s)/Min (1000 mL/Hr) IV Continuous <Continuous>  oxytocin Infusion 333.333 milliUNIT(s)/Min (1000 mL/Hr) IV Continuous <Continuous>    MEDICATIONS  (PRN):  dexAMETHasone  Injectable 4 milliGRAM(s) IV Push every 6 hours PRN Nausea  diphenhydrAMINE 25 milliGRAM(s) Oral every 6 hours PRN Pruritus  lanolin Ointment 1 Application(s) Topical every 6 hours PRN Sore Nipples  magnesium hydroxide Suspension 30 milliLiter(s) Oral two times a day PRN Constipation  nalbuphine Injectable 2.5 milliGRAM(s) IV Push every 6 hours PRN Pruritus  naloxone Injectable 0.1 milliGRAM(s) IV Push every 3 minutes PRN For ANY of the following changes in patient status:  A. Breaths Per Minute LESS THAN 10, B. Oxygen saturation LESS THAN 90%, C. Sedation score of 6 for Stop After: 4 Times  ondansetron Injectable 4 milliGRAM(s) IV Push every 6 hours PRN Nausea  oxyCODONE    IR 5 milliGRAM(s) Oral every 3 hours PRN Mild Pain (1 - 3)  oxyCODONE    IR 10 milliGRAM(s) Oral every 3 hours PRN Moderate Pain (4 - 6)  oxyCODONE    IR 5 milliGRAM(s) Oral every 3 hours PRN Moderate to Severe Pain (4-10)  simethicone 80 milliGRAM(s) Chew every 4 hours PRN Gas      Physical Exam:  Constitutional: NAD  Pulmonary: clear to auscultation bilaterally   Cardiovascular: Regular rate and rhythm   Abdomen: incision site clean, dry, intact. Soft, mildly tender, [ ] distended, no guarding, no rebound, [ ] bowel sounds  Extremities: no lower extremity edema or calve tenderness. SCDs in place     LABS:                        8.2    10.39 )-----------( 192      ( 13 Mar 2021 07:48 )             26.5                 RADIOLOGY & ADDITIONAL TESTS:    
Postpartum Note-  Section POD#1    Prenatal Labs  Blood type: B Positive  Rubella IgG: Immune  RPR: Negative          S:Patient w/o complaints, pain is moderately controlled.  Pt is OOB, but felt dizzy,, tolerating PO, passing flatus. Voiding. Lochia WNL.     O:  Vital Signs Last 24 Hrs  T(C): 36.7 (13 Mar 2021 05:57), Max: 37.2 (12 Mar 2021 15:00)  T(F): 98 (13 Mar 2021 05:57), Max: 98.9 (12 Mar 2021 15:00)  HR: 73 (13 Mar 2021 05:57) (56 - 84)  BP: 92/54 (13 Mar 2021 05:57) (92/54 - 126/61)  BP(mean): 86 (12 Mar 2021 11:25) (78 - 88)  RR: 18 (13 Mar 2021 05:57) (13 - 33)  SpO2: 97% (13 Mar 2021 05:57) (95% - 99%)  I&O's Summary    12 Mar 2021 07:01  -  13 Mar 2021 07:00  --------------------------------------------------------  IN: 2400 mL / OUT: 2429 mL / NET: -29 mL        Gen: NAD  Abdomen: Soft, appropriate tenderness, non-distended, fundus firm.  Incision: Clean/dry/ intact. no erythema, no ecchymosis   Sub Q   Dermabond  Lochia WNL  Ext:Nontender    LABS:                
OB Progress Note: LTCS, POD#2    S: 36yo POD#2 s/p  LTCS. Pain is well controlled. She is tolerating a regular diet and passing flatus. She is voiding spontaneously, and ambulating without difficulty. Endorses light vaginal bleeding, soaking <1 pad/hr. Denies CP/SOB. Denies lightheadedness/dizziness. Denies N/V.    O:  Vitals:  Vital Signs Last 24 Hrs  T(C): 36.9 (13 Mar 2021 21:33), Max: 37.1 (13 Mar 2021 09:00)  T(F): 98.4 (13 Mar 2021 21:33), Max: 98.7 (13 Mar 2021 09:00)  HR: 78 (13 Mar 2021 21:33) (73 - 86)  BP: 90/61 (13 Mar 2021 21:33) (90/61 - 100/63)  BP(mean): --  RR: 18 (13 Mar 2021 21:33) (16 - 18)  SpO2: 97% (13 Mar 2021 21:33) (96% - 97%)    MEDICATIONS  (STANDING):  acetaminophen   Tablet .. 975 milliGRAM(s) Oral <User Schedule>  ascorbic acid 500 milliGRAM(s) Oral three times a day  diphtheria/tetanus/pertussis (acellular) Vaccine (ADAcel) 0.5 milliLiter(s) IntraMuscular once  ferrous    sulfate 325 milliGRAM(s) Oral three times a day  heparin   Injectable 5000 Unit(s) SubCutaneous every 12 hours  ibuprofen  Tablet. 600 milliGRAM(s) Oral every 6 hours  oxytocin Infusion 333.333 milliUNIT(s)/Min (1000 mL/Hr) IV Continuous <Continuous>  oxytocin Infusion 333.333 milliUNIT(s)/Min (1000 mL/Hr) IV Continuous <Continuous>      MEDICATIONS  (PRN):  dexAMETHasone  Injectable 4 milliGRAM(s) IV Push every 6 hours PRN Nausea  diphenhydrAMINE 25 milliGRAM(s) Oral every 6 hours PRN Pruritus  lanolin Ointment 1 Application(s) Topical every 6 hours PRN Sore Nipples  magnesium hydroxide Suspension 30 milliLiter(s) Oral two times a day PRN Constipation  nalbuphine Injectable 2.5 milliGRAM(s) IV Push every 6 hours PRN Pruritus  naloxone Injectable 0.1 milliGRAM(s) IV Push every 3 minutes PRN For ANY of the following changes in patient status:  A. Breaths Per Minute LESS THAN 10, B. Oxygen saturation LESS THAN 90%, C. Sedation score of 6 for Stop After: 4 Times  ondansetron Injectable 4 milliGRAM(s) IV Push every 6 hours PRN Nausea  oxyCODONE    IR 5 milliGRAM(s) Oral every 3 hours PRN Moderate to Severe Pain (4-10)  simethicone 80 milliGRAM(s) Chew every 4 hours PRN Gas      Labs:  Blood type: B Positive  Rubella IgG: RPR: Negative                          8.2<L>   10.39 >-----------< 192    ( 03-13 @ 07:48 )             26.5<L>                  PE:  General: NAD  Heart: extremities well-perfused  Lungs: breathing normally  Abdomen: Soft, appropriately tender, incision c/d/i, steri strips in place   Extremities: No erythema, no pitting edema

## 2021-03-14 NOTE — DISCHARGE NOTE OB - MATERIALS PROVIDED
[facility] [place] Screening Program/Breastfeeding Log/Breastfeeding Mother’s Support Group Information/Guide to Postpartum Care/[facility] Hearing Screen Program/Back To Sleep Handout/Shaken Baby Prevention Handout/Breastfeeding Guide and Packet/Birth Certificate Instructions/Discharge Medication Information for Patients and Families Pocket Guide

## 2021-03-14 NOTE — PROGRESS NOTE ADULT - PROBLEM SELECTOR PLAN 1
- Continue regular diet.  - Increase ambulation.  - Continue motrin, tylenol, oxycodone PRN for pain control.     Aliyah Lopez, PGY-1
Increase OOB  PO Pain Protocol  DVT ppx  Dressing removed  Regular diet  AM CBC  Routine Postpartum/Post-op care

## 2021-03-14 NOTE — DISCHARGE NOTE OB - MEDICATION SUMMARY - MEDICATIONS TO TAKE
I will START or STAY ON the medications listed below when I get home from the hospital:    acetaminophen 325 mg oral tablet  -- 3 tab(s) by mouth   -- Indication: For  delivery delivered    ibuprofen 600 mg oral tablet  -- 1 tab(s) by mouth every 6 hours  -- Indication: For  delivery delivered    Prenatal 1 oral capsule  -- Indication: For  delivery delivered

## 2021-03-14 NOTE — DISCHARGE NOTE OB - CARE PLAN
Principal Discharge DX:	Term birth of   Goal:	routine pp  Assessment and plan of treatment:	office appt in 2 weeks / regular diet and activity as directed

## 2021-03-14 NOTE — DISCHARGE NOTE OB - PATIENT PORTAL LINK FT
You can access the FollowMyHealth Patient Portal offered by [facility] by registering at the following website: [email or web address]. By joining [facility]’s FollowMyHealth portal, you will also be able to view your health information using other applications (apps) compatible with our system.

## 2021-03-14 NOTE — DISCHARGE NOTE OB - MEDICATION SUMMARY - MEDICATIONS TO STOP TAKING
I will STOP taking the medications listed below when I get home from the hospital:    Prenatal 1 Plus 1

## 2023-04-25 NOTE — OB PST NOTE - NSWEIGHTCALCTOOLDRUG_GEN_A_CORE
Medical Week 1 Survey    Flowsheet Row Responses   [facility] patient discharged from? [facility]   Does the patient have one of the following disease processes/diagnoses(primary or secondary)? Other   Week 1 attempt successful? Yes   Call start time 1124   Call end time 1126   Discharge diagnosis dyspnea,  paraesophageal hernia   Person spoke with today (if not patient) and relationship Tara Luu reviewed with patient/caregiver? Yes   Is the patient having any side effects they believe may be caused by any medication additions or changes? No   Does the patient have all medications ordered at discharge? Yes   Is the patient taking all medications as directed (includes completed medication regime)? Yes   Does the patient have a primary care provider?  Yes   Does the patient have an appointment with their PCP within 7 days of discharge? Yes   Has the patient kept scheduled appointments due by today? N/A   Has home health visited the patient within 72 hours of discharge? N/A   Psychosocial issues? No   Did the patient receive a copy of their discharge instructions? Yes   Nursing interventions Reviewed instructions with patient   What is the patient's perception of their health status since discharge? Improving   Is the patient/caregiver able to teach back signs and symptoms related to disease process for when to call PCP? Yes   Is the patient/caregiver able to teach back signs and symptoms related to disease process for when to call 911? Yes   Is the patient/caregiver able to teach back the hierarchy of who to call/visit for symptoms/problems? PCP, Specialist, Home health nurse, Urgent Care, ED, 911 Yes   Week 1 call completed? Yes   Wrap up additional comments Daughter reports Pt is doing well, Pt has PCP appt this afternoon          YAZMIN SIMS - Registered Nurse  
 used

## 2024-01-01 NOTE — OB RN DELIVERY SUMMARY - NSNUMBEROFNEWBORNS_OBGYN_ALL_OB_NU
Problem: Bronchoconstriction:  Goal: Improve in air movement and diminished wheezing  Outcome: Progressing   Patient receiving Q6 Xopenex, BID Pulmicort.  NC @ 80ml   Patient is having an issue with finding a provider for genetic testing. Dr. Yohan Lafleur office told Margy that she does not do genetic testing for breast cancer. She thought that other people in her office do, but wasn't sure?    So patient is asking for other recommendations and other providers to see. She wants some clarification.     Patient is asking for a call back.            1

## 2025-03-02 NOTE — OB RN INTRAOPERATIVE NOTE - NS_GROUP_OBGYN_ALL_OB_FT
31 Beni